# Patient Record
Sex: MALE | Race: WHITE | NOT HISPANIC OR LATINO | Employment: OTHER | URBAN - METROPOLITAN AREA
[De-identification: names, ages, dates, MRNs, and addresses within clinical notes are randomized per-mention and may not be internally consistent; named-entity substitution may affect disease eponyms.]

---

## 2021-01-19 ENCOUNTER — APPOINTMENT (OUTPATIENT)
Dept: RADIOLOGY | Facility: CLINIC | Age: 81
End: 2021-01-19
Payer: COMMERCIAL

## 2021-01-19 ENCOUNTER — OFFICE VISIT (OUTPATIENT)
Dept: URGENT CARE | Facility: CLINIC | Age: 81
End: 2021-01-19
Payer: COMMERCIAL

## 2021-01-19 VITALS
DIASTOLIC BLOOD PRESSURE: 58 MMHG | WEIGHT: 164 LBS | SYSTOLIC BLOOD PRESSURE: 136 MMHG | HEART RATE: 56 BPM | TEMPERATURE: 97.5 F | OXYGEN SATURATION: 100 % | RESPIRATION RATE: 16 BRPM

## 2021-01-19 DIAGNOSIS — T25.221A PARTIAL THICKNESS BURN OF RIGHT FOOT, INITIAL ENCOUNTER: ICD-10-CM

## 2021-01-19 DIAGNOSIS — T25.221A PARTIAL THICKNESS BURN OF RIGHT FOOT, INITIAL ENCOUNTER: Primary | ICD-10-CM

## 2021-01-19 PROCEDURE — 73630 X-RAY EXAM OF FOOT: CPT

## 2021-01-19 PROCEDURE — 99203 OFFICE O/P NEW LOW 30 MIN: CPT | Performed by: FAMILY MEDICINE

## 2021-01-19 RX ORDER — FLUTICASONE PROPIONATE 50 MCG
SPRAY, SUSPENSION (ML) NASAL
COMMUNITY
Start: 2021-01-06

## 2021-01-19 RX ORDER — CANDESARTAN 4 MG/1
TABLET ORAL
COMMUNITY
Start: 2020-10-29

## 2021-01-19 RX ORDER — TICAGRELOR 90 MG/1
TABLET ORAL
COMMUNITY
Start: 2020-12-20

## 2021-01-19 RX ORDER — FINASTERIDE 5 MG/1
TABLET, FILM COATED ORAL
COMMUNITY
Start: 2021-01-18

## 2021-01-19 RX ORDER — ASPIRIN 81 MG/1
TABLET, COATED ORAL
COMMUNITY
Start: 2020-12-21

## 2021-01-19 RX ORDER — ATORVASTATIN CALCIUM 40 MG/1
TABLET, FILM COATED ORAL
COMMUNITY
Start: 2021-01-18
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
## 2021-03-10 ENCOUNTER — NEW REFERRAL (OUTPATIENT)
Dept: URBAN - METROPOLITAN AREA CLINIC 52 | Facility: CLINIC | Age: 81
End: 2021-03-10

## 2021-03-10 DIAGNOSIS — H25.812: ICD-10-CM

## 2021-03-10 DIAGNOSIS — H43.813: ICD-10-CM

## 2021-03-10 DIAGNOSIS — H35.3112: ICD-10-CM

## 2021-03-10 DIAGNOSIS — H40.1133: ICD-10-CM

## 2021-03-10 DIAGNOSIS — H35.3221: ICD-10-CM

## 2021-03-10 DIAGNOSIS — Z96.1: ICD-10-CM

## 2021-03-10 PROCEDURE — 67028 INJECTION EYE DRUG: CPT

## 2021-03-10 PROCEDURE — 99204 OFFICE O/P NEW MOD 45 MIN: CPT | Mod: 25

## 2021-03-10 PROCEDURE — 92235 FLUORESCEIN ANGRPH MLTIFRAME: CPT

## 2021-03-10 PROCEDURE — 92250 FUNDUS PHOTOGRAPHY W/I&R: CPT

## 2021-03-10 ASSESSMENT — VISUAL ACUITY
OD_CC: 20/70
OD_CC: 20/200
OS_CC: 20/70-2
OS_CC: 20/30

## 2021-03-10 ASSESSMENT — TONOMETRY
OD_IOP_MMHG: 21
OS_IOP_MMHG: 15

## 2021-03-24 ENCOUNTER — FOLLOW UP (OUTPATIENT)
Dept: URBAN - METROPOLITAN AREA CLINIC 52 | Facility: CLINIC | Age: 81
End: 2021-03-24

## 2021-03-24 DIAGNOSIS — H43.813: ICD-10-CM

## 2021-03-24 DIAGNOSIS — H40.1133: ICD-10-CM

## 2021-03-24 DIAGNOSIS — H35.3112: ICD-10-CM

## 2021-03-24 DIAGNOSIS — Z96.1: ICD-10-CM

## 2021-03-24 DIAGNOSIS — H25.812: ICD-10-CM

## 2021-03-24 DIAGNOSIS — H35.3221: ICD-10-CM

## 2021-03-24 PROCEDURE — 92012 INTRM OPH EXAM EST PATIENT: CPT

## 2021-03-24 PROCEDURE — 92134 CPTRZ OPH DX IMG PST SGM RTA: CPT

## 2021-03-24 ASSESSMENT — VISUAL ACUITY
OS_CC: 20/25
OD_CC: 20/60+1

## 2021-03-24 ASSESSMENT — TONOMETRY
OD_IOP_MMHG: 21
OS_IOP_MMHG: 17

## 2021-04-14 ENCOUNTER — FOLLOW UP (OUTPATIENT)
Dept: URBAN - METROPOLITAN AREA CLINIC 52 | Facility: CLINIC | Age: 81
End: 2021-04-14

## 2021-04-14 DIAGNOSIS — Z96.1: ICD-10-CM

## 2021-04-14 DIAGNOSIS — H35.3112: ICD-10-CM

## 2021-04-14 DIAGNOSIS — H40.1133: ICD-10-CM

## 2021-04-14 DIAGNOSIS — H35.3221: ICD-10-CM

## 2021-04-14 DIAGNOSIS — H25.812: ICD-10-CM

## 2021-04-14 DIAGNOSIS — H43.813: ICD-10-CM

## 2021-04-14 PROCEDURE — PFS EYLEA PFS

## 2021-04-14 PROCEDURE — 92014 COMPRE OPH EXAM EST PT 1/>: CPT | Mod: 25

## 2021-04-14 PROCEDURE — 67028 INJECTION EYE DRUG: CPT

## 2021-04-14 PROCEDURE — 92134 CPTRZ OPH DX IMG PST SGM RTA: CPT

## 2021-04-14 ASSESSMENT — VISUAL ACUITY
OS_CC: 20/25
OD_CC: 20/70

## 2021-04-14 ASSESSMENT — TONOMETRY
OD_IOP_MMHG: 18
OS_IOP_MMHG: 10

## 2021-06-02 ENCOUNTER — FOLLOW UP (OUTPATIENT)
Dept: URBAN - METROPOLITAN AREA CLINIC 52 | Facility: CLINIC | Age: 81
End: 2021-06-02

## 2021-06-02 DIAGNOSIS — H25.812: ICD-10-CM

## 2021-06-02 DIAGNOSIS — Z96.1: ICD-10-CM

## 2021-06-02 DIAGNOSIS — H35.3221: ICD-10-CM

## 2021-06-02 DIAGNOSIS — H35.3112: ICD-10-CM

## 2021-06-02 DIAGNOSIS — H40.1133: ICD-10-CM

## 2021-06-02 DIAGNOSIS — H43.813: ICD-10-CM

## 2021-06-02 PROCEDURE — 92012 INTRM OPH EXAM EST PATIENT: CPT | Mod: 25

## 2021-06-02 PROCEDURE — 92134 CPTRZ OPH DX IMG PST SGM RTA: CPT

## 2021-06-02 PROCEDURE — PFS EYLEA PFS

## 2021-06-02 PROCEDURE — 67028 INJECTION EYE DRUG: CPT

## 2021-06-02 ASSESSMENT — TONOMETRY
OD_IOP_MMHG: 12
OS_IOP_MMHG: 13

## 2021-06-02 ASSESSMENT — VISUAL ACUITY
OD_CC: 20/70
OS_CC: 20/25

## 2021-07-28 ENCOUNTER — FOLLOW UP (OUTPATIENT)
Dept: URBAN - METROPOLITAN AREA CLINIC 52 | Facility: CLINIC | Age: 81
End: 2021-07-28

## 2021-07-28 DIAGNOSIS — H35.3221: ICD-10-CM

## 2021-07-28 DIAGNOSIS — H35.3112: ICD-10-CM

## 2021-07-28 DIAGNOSIS — H43.813: ICD-10-CM

## 2021-07-28 DIAGNOSIS — H25.812: ICD-10-CM

## 2021-07-28 DIAGNOSIS — H40.1133: ICD-10-CM

## 2021-07-28 DIAGNOSIS — Z96.1: ICD-10-CM

## 2021-07-28 PROCEDURE — 92134 CPTRZ OPH DX IMG PST SGM RTA: CPT

## 2021-07-28 PROCEDURE — 92014 COMPRE OPH EXAM EST PT 1/>: CPT | Mod: 25

## 2021-07-28 PROCEDURE — 67028 INJECTION EYE DRUG: CPT

## 2021-07-28 PROCEDURE — 92202 OPSCPY EXTND ON/MAC DRAW: CPT

## 2021-07-28 PROCEDURE — PFS EYLEA PFS

## 2021-07-28 ASSESSMENT — TONOMETRY
OD_IOP_MMHG: 16
OS_IOP_MMHG: 11

## 2021-07-28 ASSESSMENT — VISUAL ACUITY
OS_CC: 20/30-
OD_CC: 20/100

## 2021-10-06 ENCOUNTER — FOLLOW UP (OUTPATIENT)
Dept: URBAN - METROPOLITAN AREA CLINIC 52 | Facility: CLINIC | Age: 81
End: 2021-10-06

## 2021-10-06 DIAGNOSIS — H35.3221: ICD-10-CM

## 2021-10-06 DIAGNOSIS — H35.3112: ICD-10-CM

## 2021-10-06 DIAGNOSIS — Z96.1: ICD-10-CM

## 2021-10-06 DIAGNOSIS — H43.813: ICD-10-CM

## 2021-10-06 DIAGNOSIS — H25.812: ICD-10-CM

## 2021-10-06 DIAGNOSIS — H40.1133: ICD-10-CM

## 2021-10-06 PROCEDURE — 92014 COMPRE OPH EXAM EST PT 1/>: CPT | Mod: 25

## 2021-10-06 PROCEDURE — 92202 OPSCPY EXTND ON/MAC DRAW: CPT

## 2021-10-06 PROCEDURE — PFS EYLEA PFS

## 2021-10-06 PROCEDURE — 67028 INJECTION EYE DRUG: CPT

## 2021-10-06 PROCEDURE — 92134 CPTRZ OPH DX IMG PST SGM RTA: CPT

## 2021-10-06 ASSESSMENT — TONOMETRY
OS_IOP_MMHG: 16
OD_IOP_MMHG: 14

## 2021-10-06 ASSESSMENT — VISUAL ACUITY
OD_CC: 20/80
OS_CC: 20/30

## 2021-12-15 ENCOUNTER — FOLLOW UP (OUTPATIENT)
Dept: URBAN - METROPOLITAN AREA CLINIC 52 | Facility: CLINIC | Age: 81
End: 2021-12-15

## 2021-12-15 DIAGNOSIS — H25.812: ICD-10-CM

## 2021-12-15 DIAGNOSIS — H40.1133: ICD-10-CM

## 2021-12-15 DIAGNOSIS — H35.3221: ICD-10-CM

## 2021-12-15 DIAGNOSIS — H43.813: ICD-10-CM

## 2021-12-15 DIAGNOSIS — H35.3112: ICD-10-CM

## 2021-12-15 DIAGNOSIS — Z96.1: ICD-10-CM

## 2021-12-15 PROCEDURE — 92202 OPSCPY EXTND ON/MAC DRAW: CPT

## 2021-12-15 PROCEDURE — 67028 INJECTION EYE DRUG: CPT

## 2021-12-15 PROCEDURE — PFS EYLEA PFS

## 2021-12-15 PROCEDURE — 92014 COMPRE OPH EXAM EST PT 1/>: CPT | Mod: 25

## 2021-12-15 PROCEDURE — 92134 CPTRZ OPH DX IMG PST SGM RTA: CPT

## 2021-12-15 ASSESSMENT — TONOMETRY
OD_IOP_MMHG: 21
OS_IOP_MMHG: 8

## 2021-12-15 ASSESSMENT — VISUAL ACUITY
OS_CC: 20/30-2
OD_CC: 20/80-2

## 2022-03-11 ENCOUNTER — FOLLOW UP (OUTPATIENT)
Dept: URBAN - METROPOLITAN AREA CLINIC 52 | Facility: CLINIC | Age: 82
End: 2022-03-11

## 2022-03-11 DIAGNOSIS — Z96.1: ICD-10-CM

## 2022-03-11 DIAGNOSIS — H35.3112: ICD-10-CM

## 2022-03-11 DIAGNOSIS — H43.813: ICD-10-CM

## 2022-03-11 DIAGNOSIS — H35.3221: ICD-10-CM

## 2022-03-11 DIAGNOSIS — H25.812: ICD-10-CM

## 2022-03-11 DIAGNOSIS — H40.1133: ICD-10-CM

## 2022-03-11 PROCEDURE — 92134 CPTRZ OPH DX IMG PST SGM RTA: CPT

## 2022-03-11 PROCEDURE — 92202 OPSCPY EXTND ON/MAC DRAW: CPT

## 2022-03-11 PROCEDURE — 92014 COMPRE OPH EXAM EST PT 1/>: CPT | Mod: 25

## 2022-03-11 PROCEDURE — PFS EYLEA PFS

## 2022-03-11 PROCEDURE — 67028 INJECTION EYE DRUG: CPT

## 2022-03-11 ASSESSMENT — VISUAL ACUITY
OD_CC: 20/80+2
OS_CC: 20/25

## 2022-03-11 ASSESSMENT — TONOMETRY
OD_IOP_MMHG: 14
OS_IOP_MMHG: 15

## 2022-06-01 ENCOUNTER — FOLLOW UP (OUTPATIENT)
Dept: URBAN - METROPOLITAN AREA CLINIC 52 | Facility: CLINIC | Age: 82
End: 2022-06-01

## 2022-06-01 DIAGNOSIS — H35.3112: ICD-10-CM

## 2022-06-01 DIAGNOSIS — H35.3221: ICD-10-CM

## 2022-06-01 DIAGNOSIS — H40.1133: ICD-10-CM

## 2022-06-01 DIAGNOSIS — Z96.1: ICD-10-CM

## 2022-06-01 DIAGNOSIS — H43.813: ICD-10-CM

## 2022-06-01 DIAGNOSIS — H25.812: ICD-10-CM

## 2022-06-01 PROCEDURE — PFS EYLEA PFS

## 2022-06-01 PROCEDURE — 92134 CPTRZ OPH DX IMG PST SGM RTA: CPT

## 2022-06-01 PROCEDURE — 92202 OPSCPY EXTND ON/MAC DRAW: CPT

## 2022-06-01 PROCEDURE — 92014 COMPRE OPH EXAM EST PT 1/>: CPT | Mod: 25

## 2022-06-01 PROCEDURE — 67028 INJECTION EYE DRUG: CPT

## 2022-06-01 ASSESSMENT — TONOMETRY
OD_IOP_MMHG: 21
OS_IOP_MMHG: 10

## 2022-06-01 ASSESSMENT — VISUAL ACUITY
OS_SC: 20/30-2
OD_SC: 20/80-1

## 2022-08-31 ENCOUNTER — FOLLOW UP (OUTPATIENT)
Dept: URBAN - METROPOLITAN AREA CLINIC 52 | Facility: CLINIC | Age: 82
End: 2022-08-31

## 2022-08-31 DIAGNOSIS — H35.3112: ICD-10-CM

## 2022-08-31 DIAGNOSIS — Z96.1: ICD-10-CM

## 2022-08-31 DIAGNOSIS — H43.813: ICD-10-CM

## 2022-08-31 DIAGNOSIS — H35.3221: ICD-10-CM

## 2022-08-31 DIAGNOSIS — H25.812: ICD-10-CM

## 2022-08-31 DIAGNOSIS — H40.1133: ICD-10-CM

## 2022-08-31 PROCEDURE — 67028 INJECTION EYE DRUG: CPT

## 2022-08-31 PROCEDURE — PFS EYLEA PFS

## 2022-08-31 PROCEDURE — 92134 CPTRZ OPH DX IMG PST SGM RTA: CPT

## 2022-08-31 PROCEDURE — 92014 COMPRE OPH EXAM EST PT 1/>: CPT | Mod: 25

## 2022-08-31 PROCEDURE — 92202 OPSCPY EXTND ON/MAC DRAW: CPT

## 2022-08-31 ASSESSMENT — VISUAL ACUITY
OD_CC: 20/80-2
OS_CC: 20/40-1

## 2022-08-31 ASSESSMENT — TONOMETRY
OD_IOP_MMHG: 21
OS_IOP_MMHG: 17

## 2022-11-30 ENCOUNTER — FOLLOW UP (OUTPATIENT)
Dept: URBAN - METROPOLITAN AREA CLINIC 52 | Facility: CLINIC | Age: 82
End: 2022-11-30

## 2022-11-30 DIAGNOSIS — H43.813: ICD-10-CM

## 2022-11-30 DIAGNOSIS — H35.3221: ICD-10-CM

## 2022-11-30 DIAGNOSIS — H35.3112: ICD-10-CM

## 2022-11-30 DIAGNOSIS — Z96.1: ICD-10-CM

## 2022-11-30 DIAGNOSIS — H40.1133: ICD-10-CM

## 2022-11-30 DIAGNOSIS — H25.812: ICD-10-CM

## 2022-11-30 PROCEDURE — 92134 CPTRZ OPH DX IMG PST SGM RTA: CPT

## 2022-11-30 PROCEDURE — 67028 INJECTION EYE DRUG: CPT

## 2022-11-30 PROCEDURE — PFS EYLEA PFS

## 2022-11-30 PROCEDURE — 92014 COMPRE OPH EXAM EST PT 1/>: CPT | Mod: 25

## 2022-11-30 ASSESSMENT — VISUAL ACUITY
OS_CC: 20/40
OD_CC: 20/100-2

## 2022-11-30 ASSESSMENT — TONOMETRY
OS_IOP_MMHG: 14
OD_IOP_MMHG: 23

## 2023-03-01 ENCOUNTER — FOLLOW UP (OUTPATIENT)
Dept: URBAN - METROPOLITAN AREA CLINIC 52 | Facility: CLINIC | Age: 83
End: 2023-03-01

## 2023-03-01 DIAGNOSIS — H40.1133: ICD-10-CM

## 2023-03-01 DIAGNOSIS — H25.812: ICD-10-CM

## 2023-03-01 DIAGNOSIS — H35.3221: ICD-10-CM

## 2023-03-01 DIAGNOSIS — H35.3112: ICD-10-CM

## 2023-03-01 DIAGNOSIS — Z96.1: ICD-10-CM

## 2023-03-01 DIAGNOSIS — H43.813: ICD-10-CM

## 2023-03-01 PROCEDURE — PFS EYLEA PFS

## 2023-03-01 PROCEDURE — 92014 COMPRE OPH EXAM EST PT 1/>: CPT | Mod: 25

## 2023-03-01 PROCEDURE — 92202 OPSCPY EXTND ON/MAC DRAW: CPT

## 2023-03-01 PROCEDURE — 67028 INJECTION EYE DRUG: CPT

## 2023-03-01 ASSESSMENT — TONOMETRY
OD_IOP_MMHG: 22
OS_IOP_MMHG: 14

## 2023-03-01 ASSESSMENT — VISUAL ACUITY
OD_CC: 20/100
OS_CC: 20/40+1

## 2023-06-07 ENCOUNTER — FOLLOW UP (OUTPATIENT)
Dept: URBAN - METROPOLITAN AREA CLINIC 52 | Facility: CLINIC | Age: 83
End: 2023-06-07

## 2023-06-07 DIAGNOSIS — H35.3112: ICD-10-CM

## 2023-06-07 DIAGNOSIS — H35.3221: ICD-10-CM

## 2023-06-07 DIAGNOSIS — H43.813: ICD-10-CM

## 2023-06-07 DIAGNOSIS — H25.812: ICD-10-CM

## 2023-06-07 DIAGNOSIS — H40.1133: ICD-10-CM

## 2023-06-07 DIAGNOSIS — Z96.1: ICD-10-CM

## 2023-06-07 PROCEDURE — PFS EYLEA PFS

## 2023-06-07 PROCEDURE — 92014 COMPRE OPH EXAM EST PT 1/>: CPT | Mod: 25

## 2023-06-07 PROCEDURE — 67028 INJECTION EYE DRUG: CPT

## 2023-06-07 PROCEDURE — 92134 CPTRZ OPH DX IMG PST SGM RTA: CPT

## 2023-06-07 ASSESSMENT — VISUAL ACUITY
OD_CC: 20/200
OS_CC: 20/40

## 2023-06-07 ASSESSMENT — TONOMETRY
OS_IOP_MMHG: 13
OD_IOP_MMHG: 24

## 2023-08-30 ENCOUNTER — FOLLOW UP (OUTPATIENT)
Dept: URBAN - METROPOLITAN AREA CLINIC 52 | Facility: CLINIC | Age: 83
End: 2023-08-30

## 2023-08-30 DIAGNOSIS — H25.812: ICD-10-CM

## 2023-08-30 DIAGNOSIS — H35.3221: ICD-10-CM

## 2023-08-30 DIAGNOSIS — Z96.1: ICD-10-CM

## 2023-08-30 DIAGNOSIS — H43.813: ICD-10-CM

## 2023-08-30 DIAGNOSIS — H35.3112: ICD-10-CM

## 2023-08-30 DIAGNOSIS — H40.1133: ICD-10-CM

## 2023-08-30 PROCEDURE — 92202 OPSCPY EXTND ON/MAC DRAW: CPT | Mod: NC

## 2023-08-30 PROCEDURE — 67028 INJECTION EYE DRUG: CPT

## 2023-08-30 PROCEDURE — PFS EYLEA PFS: Mod: JZ

## 2023-08-30 PROCEDURE — 92014 COMPRE OPH EXAM EST PT 1/>: CPT | Mod: 25

## 2023-08-30 PROCEDURE — 92250 FUNDUS PHOTOGRAPHY W/I&R: CPT

## 2023-08-30 ASSESSMENT — VISUAL ACUITY
OD_CC: 20/150-2
OS_CC: 20/40-2

## 2023-08-30 ASSESSMENT — TONOMETRY
OD_IOP_MMHG: 28
OS_IOP_MMHG: 17

## 2023-11-29 ENCOUNTER — FOLLOW UP (OUTPATIENT)
Dept: URBAN - METROPOLITAN AREA CLINIC 52 | Facility: CLINIC | Age: 83
End: 2023-11-29

## 2023-11-29 DIAGNOSIS — H35.3112: ICD-10-CM

## 2023-11-29 DIAGNOSIS — H43.813: ICD-10-CM

## 2023-11-29 DIAGNOSIS — Z96.1: ICD-10-CM

## 2023-11-29 DIAGNOSIS — H35.3221: ICD-10-CM

## 2023-11-29 DIAGNOSIS — H40.1133: ICD-10-CM

## 2023-11-29 DIAGNOSIS — H25.812: ICD-10-CM

## 2023-11-29 PROCEDURE — 92202 OPSCPY EXTND ON/MAC DRAW: CPT | Mod: NC

## 2023-11-29 PROCEDURE — HD EYLEA HD 8MG: Mod: JZ

## 2023-11-29 PROCEDURE — 92014 COMPRE OPH EXAM EST PT 1/>: CPT | Mod: 25

## 2023-11-29 PROCEDURE — 67028 INJECTION EYE DRUG: CPT

## 2023-11-29 PROCEDURE — 92134 CPTRZ OPH DX IMG PST SGM RTA: CPT

## 2023-11-29 PROCEDURE — 92250 FUNDUS PHOTOGRAPHY W/I&R: CPT

## 2023-11-29 ASSESSMENT — VISUAL ACUITY
OS_PH: 20/40-2
OD_PH: 20/250-2
OD_CC: 20/350+1
OS_CC: 20/60-1

## 2023-11-29 ASSESSMENT — TONOMETRY
OD_IOP_MMHG: 20
OS_IOP_MMHG: 12

## 2023-12-01 ENCOUNTER — PROBLEM (OUTPATIENT)
Dept: URBAN - METROPOLITAN AREA CLINIC 52 | Facility: CLINIC | Age: 83
End: 2023-12-01

## 2023-12-01 DIAGNOSIS — H40.1133: ICD-10-CM

## 2023-12-01 DIAGNOSIS — H35.3221: ICD-10-CM

## 2023-12-01 DIAGNOSIS — H35.3112: ICD-10-CM

## 2023-12-01 DIAGNOSIS — H25.812: ICD-10-CM

## 2023-12-01 DIAGNOSIS — H43.12: ICD-10-CM

## 2023-12-01 DIAGNOSIS — Z96.1: ICD-10-CM

## 2023-12-01 DIAGNOSIS — H43.813: ICD-10-CM

## 2023-12-01 PROCEDURE — 92134 CPTRZ OPH DX IMG PST SGM RTA: CPT

## 2023-12-01 PROCEDURE — 92012 INTRM OPH EXAM EST PATIENT: CPT

## 2023-12-01 PROCEDURE — 92201 OPSCPY EXTND RTA DRAW UNI/BI: CPT

## 2023-12-01 ASSESSMENT — VISUAL ACUITY
OS_PH: 20/50-1
OS_CC: 20/80+1
OD_PH: 20/300-1
OD_CC: 20/350-1

## 2023-12-01 ASSESSMENT — TONOMETRY
OD_IOP_MMHG: 19
OS_IOP_MMHG: 09

## 2023-12-04 ENCOUNTER — FOLLOW UP (OUTPATIENT)
Dept: URBAN - METROPOLITAN AREA CLINIC 52 | Facility: CLINIC | Age: 83
End: 2023-12-04

## 2023-12-04 DIAGNOSIS — H35.3221: ICD-10-CM

## 2023-12-04 DIAGNOSIS — D31.32: ICD-10-CM

## 2023-12-04 DIAGNOSIS — H25.812: ICD-10-CM

## 2023-12-04 DIAGNOSIS — H43.12: ICD-10-CM

## 2023-12-04 DIAGNOSIS — H40.1133: ICD-10-CM

## 2023-12-04 DIAGNOSIS — H43.813: ICD-10-CM

## 2023-12-04 PROCEDURE — 92202 OPSCPY EXTND ON/MAC DRAW: CPT

## 2023-12-04 PROCEDURE — 92012 INTRM OPH EXAM EST PATIENT: CPT

## 2023-12-04 ASSESSMENT — VISUAL ACUITY
OS_CC: 20/40
OD_CC: 20/400

## 2023-12-04 ASSESSMENT — TONOMETRY
OD_IOP_MMHG: 15
OS_IOP_MMHG: 13

## 2023-12-11 ENCOUNTER — PROBLEM (OUTPATIENT)
Dept: URBAN - METROPOLITAN AREA CLINIC 52 | Facility: CLINIC | Age: 83
End: 2023-12-11

## 2023-12-11 DIAGNOSIS — H43.12: ICD-10-CM

## 2023-12-11 DIAGNOSIS — H35.3112: ICD-10-CM

## 2023-12-11 DIAGNOSIS — H40.1133: ICD-10-CM

## 2023-12-11 DIAGNOSIS — Z96.1: ICD-10-CM

## 2023-12-11 DIAGNOSIS — H25.812: ICD-10-CM

## 2023-12-11 DIAGNOSIS — H35.3221: ICD-10-CM

## 2023-12-11 DIAGNOSIS — D31.32: ICD-10-CM

## 2023-12-11 DIAGNOSIS — H43.813: ICD-10-CM

## 2023-12-11 PROCEDURE — 92012 INTRM OPH EXAM EST PATIENT: CPT

## 2023-12-11 PROCEDURE — 92134 CPTRZ OPH DX IMG PST SGM RTA: CPT

## 2023-12-11 PROCEDURE — 92201 OPSCPY EXTND RTA DRAW UNI/BI: CPT

## 2023-12-11 PROCEDURE — 76512 OPH US DX B-SCAN: CPT

## 2023-12-11 ASSESSMENT — TONOMETRY
OS_IOP_MMHG: 9
OD_IOP_MMHG: 18

## 2023-12-11 ASSESSMENT — VISUAL ACUITY
OS_CC: 20/50
OD_CC: 20/400

## 2023-12-18 ENCOUNTER — FOLLOW UP (OUTPATIENT)
Dept: URBAN - METROPOLITAN AREA CLINIC 52 | Facility: CLINIC | Age: 83
End: 2023-12-18

## 2023-12-18 DIAGNOSIS — H25.812: ICD-10-CM

## 2023-12-18 DIAGNOSIS — H40.1133: ICD-10-CM

## 2023-12-18 DIAGNOSIS — D31.32: ICD-10-CM

## 2023-12-18 DIAGNOSIS — H35.3221: ICD-10-CM

## 2023-12-18 DIAGNOSIS — Z96.1: ICD-10-CM

## 2023-12-18 DIAGNOSIS — H35.3112: ICD-10-CM

## 2023-12-18 DIAGNOSIS — H43.12: ICD-10-CM

## 2023-12-18 DIAGNOSIS — H43.813: ICD-10-CM

## 2023-12-18 PROCEDURE — 76512 OPH US DX B-SCAN: CPT

## 2023-12-18 PROCEDURE — 92134 CPTRZ OPH DX IMG PST SGM RTA: CPT

## 2023-12-18 PROCEDURE — 92201 OPSCPY EXTND RTA DRAW UNI/BI: CPT

## 2023-12-18 PROCEDURE — 92014 COMPRE OPH EXAM EST PT 1/>: CPT

## 2023-12-18 ASSESSMENT — VISUAL ACUITY
OD_CC: 20/250-1
OS_CC: 20/50-2

## 2023-12-18 ASSESSMENT — TONOMETRY
OS_IOP_MMHG: 10
OD_IOP_MMHG: 16

## 2024-01-03 ENCOUNTER — FOLLOW UP (OUTPATIENT)
Dept: URBAN - METROPOLITAN AREA CLINIC 52 | Facility: CLINIC | Age: 84
End: 2024-01-03

## 2024-01-03 DIAGNOSIS — H43.813: ICD-10-CM

## 2024-01-03 DIAGNOSIS — H40.1133: ICD-10-CM

## 2024-01-03 DIAGNOSIS — D31.32: ICD-10-CM

## 2024-01-03 DIAGNOSIS — Z96.1: ICD-10-CM

## 2024-01-03 DIAGNOSIS — H35.3112: ICD-10-CM

## 2024-01-03 DIAGNOSIS — H43.12: ICD-10-CM

## 2024-01-03 DIAGNOSIS — H35.3221: ICD-10-CM

## 2024-01-03 DIAGNOSIS — H25.812: ICD-10-CM

## 2024-01-03 PROCEDURE — 92201 OPSCPY EXTND RTA DRAW UNI/BI: CPT

## 2024-01-03 PROCEDURE — 92134 CPTRZ OPH DX IMG PST SGM RTA: CPT

## 2024-01-03 PROCEDURE — 76512 OPH US DX B-SCAN: CPT

## 2024-01-03 PROCEDURE — 92014 COMPRE OPH EXAM EST PT 1/>: CPT

## 2024-01-03 ASSESSMENT — TONOMETRY
OS_IOP_MMHG: 7
OD_IOP_MMHG: 19

## 2024-01-03 ASSESSMENT — VISUAL ACUITY
OS_CC: 20/60-2
OD_CC: 20/250-1

## 2024-01-24 ENCOUNTER — FOLLOW UP (OUTPATIENT)
Dept: URBAN - METROPOLITAN AREA CLINIC 52 | Facility: CLINIC | Age: 84
End: 2024-01-24

## 2024-01-24 DIAGNOSIS — H43.12: ICD-10-CM

## 2024-01-24 DIAGNOSIS — Z96.1: ICD-10-CM

## 2024-01-24 DIAGNOSIS — D31.32: ICD-10-CM

## 2024-01-24 DIAGNOSIS — H35.3221: ICD-10-CM

## 2024-01-24 PROCEDURE — 92014 COMPRE OPH EXAM EST PT 1/>: CPT

## 2024-01-24 PROCEDURE — 92134 CPTRZ OPH DX IMG PST SGM RTA: CPT

## 2024-01-24 PROCEDURE — 92202 OPSCPY EXTND ON/MAC DRAW: CPT

## 2024-01-24 ASSESSMENT — VISUAL ACUITY
OS_CC: 20/70
OD_CC: 20/300

## 2024-01-24 ASSESSMENT — TONOMETRY
OD_IOP_MMHG: 22
OS_IOP_MMHG: 10

## 2024-03-06 ENCOUNTER — FOLLOW UP (OUTPATIENT)
Dept: URBAN - METROPOLITAN AREA CLINIC 52 | Facility: CLINIC | Age: 84
End: 2024-03-06

## 2024-03-06 DIAGNOSIS — H35.3112: ICD-10-CM

## 2024-03-06 DIAGNOSIS — H35.3221: ICD-10-CM

## 2024-03-06 DIAGNOSIS — Z96.1: ICD-10-CM

## 2024-03-06 DIAGNOSIS — H25.812: ICD-10-CM

## 2024-03-06 DIAGNOSIS — H43.813: ICD-10-CM

## 2024-03-06 DIAGNOSIS — D31.32: ICD-10-CM

## 2024-03-06 DIAGNOSIS — H43.12: ICD-10-CM

## 2024-03-06 DIAGNOSIS — H40.1133: ICD-10-CM

## 2024-03-06 PROCEDURE — HD EYLEA HD 8MG: Mod: JZ

## 2024-03-06 PROCEDURE — 92014 COMPRE OPH EXAM EST PT 1/>: CPT | Mod: 25

## 2024-03-06 PROCEDURE — 92134 CPTRZ OPH DX IMG PST SGM RTA: CPT

## 2024-03-06 PROCEDURE — 67028 INJECTION EYE DRUG: CPT

## 2024-03-06 ASSESSMENT — TONOMETRY
OS_IOP_MMHG: 14
OD_IOP_MMHG: 23

## 2024-03-06 ASSESSMENT — VISUAL ACUITY
OD_CC: 20/350+1
OS_CC: 20/70-2

## 2024-04-17 ENCOUNTER — FOLLOW UP (OUTPATIENT)
Dept: URBAN - METROPOLITAN AREA CLINIC 52 | Facility: CLINIC | Age: 84
End: 2024-04-17

## 2024-04-17 DIAGNOSIS — H35.3221: ICD-10-CM

## 2024-04-17 DIAGNOSIS — H35.3112: ICD-10-CM

## 2024-04-17 DIAGNOSIS — Z96.1: ICD-10-CM

## 2024-04-17 DIAGNOSIS — H43.813: ICD-10-CM

## 2024-04-17 DIAGNOSIS — H40.1133: ICD-10-CM

## 2024-04-17 DIAGNOSIS — H43.12: ICD-10-CM

## 2024-04-17 DIAGNOSIS — D31.32: ICD-10-CM

## 2024-04-17 DIAGNOSIS — H25.812: ICD-10-CM

## 2024-04-17 PROCEDURE — 92012 INTRM OPH EXAM EST PATIENT: CPT

## 2024-04-17 ASSESSMENT — TONOMETRY
OD_IOP_MMHG: 21
OS_IOP_MMHG: 10

## 2024-04-17 ASSESSMENT — VISUAL ACUITY
OS_CC: 20/30
OD_CC: 20/250

## 2024-05-29 ENCOUNTER — FOLLOW UP (OUTPATIENT)
Dept: URBAN - METROPOLITAN AREA CLINIC 52 | Facility: CLINIC | Age: 84
End: 2024-05-29

## 2024-05-29 DIAGNOSIS — Z96.1: ICD-10-CM

## 2024-05-29 DIAGNOSIS — D31.32: ICD-10-CM

## 2024-05-29 DIAGNOSIS — H43.813: ICD-10-CM

## 2024-05-29 DIAGNOSIS — H35.3112: ICD-10-CM

## 2024-05-29 DIAGNOSIS — H43.12: ICD-10-CM

## 2024-05-29 DIAGNOSIS — H40.1133: ICD-10-CM

## 2024-05-29 DIAGNOSIS — H35.3221: ICD-10-CM

## 2024-05-29 DIAGNOSIS — H25.812: ICD-10-CM

## 2024-05-29 PROCEDURE — 67028 INJECTION EYE DRUG: CPT

## 2024-05-29 PROCEDURE — 92134 CPTRZ OPH DX IMG PST SGM RTA: CPT

## 2024-05-29 PROCEDURE — 92014 COMPRE OPH EXAM EST PT 1/>: CPT | Mod: 25

## 2024-05-29 ASSESSMENT — VISUAL ACUITY
OS_CC: 20/40
OD_CC: 20/400

## 2024-05-29 ASSESSMENT — TONOMETRY
OS_IOP_MMHG: 11
OD_IOP_MMHG: 22

## 2024-08-28 ENCOUNTER — FOLLOW UP (OUTPATIENT)
Dept: URBAN - METROPOLITAN AREA CLINIC 52 | Facility: CLINIC | Age: 84
End: 2024-08-28

## 2024-08-28 DIAGNOSIS — H35.3221: ICD-10-CM

## 2024-08-28 DIAGNOSIS — H43.12: ICD-10-CM

## 2024-08-28 DIAGNOSIS — D31.32: ICD-10-CM

## 2024-08-28 DIAGNOSIS — H40.1133: ICD-10-CM

## 2024-08-28 DIAGNOSIS — H35.3112: ICD-10-CM

## 2024-08-28 DIAGNOSIS — H43.813: ICD-10-CM

## 2024-08-28 DIAGNOSIS — Z96.1: ICD-10-CM

## 2024-08-28 PROCEDURE — 92202 OPSCPY EXTND ON/MAC DRAW: CPT | Mod: NC

## 2024-08-28 PROCEDURE — 67028 INJECTION EYE DRUG: CPT

## 2024-08-28 PROCEDURE — 92014 COMPRE OPH EXAM EST PT 1/>: CPT | Mod: 25

## 2024-08-28 PROCEDURE — 92134 CPTRZ OPH DX IMG PST SGM RTA: CPT

## 2024-08-28 ASSESSMENT — TONOMETRY
OS_IOP_MMHG: 12
OD_IOP_MMHG: 23

## 2024-08-28 ASSESSMENT — VISUAL ACUITY
OD_CC: 20/350
OS_CC: 20/40+2

## 2024-12-11 ENCOUNTER — FOLLOW UP (OUTPATIENT)
Dept: URBAN - METROPOLITAN AREA CLINIC 52 | Facility: CLINIC | Age: 84
End: 2024-12-11

## 2024-12-11 DIAGNOSIS — H35.3221: ICD-10-CM

## 2024-12-11 DIAGNOSIS — H43.813: ICD-10-CM

## 2024-12-11 DIAGNOSIS — Z96.1: ICD-10-CM

## 2024-12-11 DIAGNOSIS — D31.32: ICD-10-CM

## 2024-12-11 DIAGNOSIS — H40.1133: ICD-10-CM

## 2024-12-11 DIAGNOSIS — H35.3112: ICD-10-CM

## 2024-12-11 PROCEDURE — 92134 CPTRZ OPH DX IMG PST SGM RTA: CPT

## 2024-12-11 PROCEDURE — 67028 INJECTION EYE DRUG: CPT

## 2024-12-11 PROCEDURE — 92014 COMPRE OPH EXAM EST PT 1/>: CPT | Mod: 25

## 2024-12-11 ASSESSMENT — TONOMETRY
OD_IOP_MMHG: 25
OD_IOP_MMHG: 28
OS_IOP_MMHG: 11

## 2024-12-11 ASSESSMENT — VISUAL ACUITY
OD_CC: 20/400+1
OS_CC: 20/40-2

## 2025-03-19 ENCOUNTER — FOLLOW UP (OUTPATIENT)
Dept: URBAN - METROPOLITAN AREA CLINIC 52 | Age: 85
End: 2025-03-19

## 2025-03-19 DIAGNOSIS — Z96.1: ICD-10-CM

## 2025-03-19 DIAGNOSIS — H35.3112: ICD-10-CM

## 2025-03-19 DIAGNOSIS — D31.32: ICD-10-CM

## 2025-03-19 DIAGNOSIS — H35.3221: ICD-10-CM

## 2025-03-19 DIAGNOSIS — H40.1133: ICD-10-CM

## 2025-03-19 DIAGNOSIS — H43.813: ICD-10-CM

## 2025-03-19 PROCEDURE — 92202 OPSCPY EXTND ON/MAC DRAW: CPT | Mod: 25,NC

## 2025-03-19 PROCEDURE — 92014 COMPRE OPH EXAM EST PT 1/>: CPT | Mod: 25

## 2025-03-19 PROCEDURE — 92134 CPTRZ OPH DX IMG PST SGM RTA: CPT

## 2025-03-19 PROCEDURE — 67028 INJECTION EYE DRUG: CPT

## 2025-03-19 ASSESSMENT — TONOMETRY
OD_IOP_MMHG: 23
OS_IOP_MMHG: 10

## 2025-03-19 ASSESSMENT — VISUAL ACUITY
OS_CC: 20/50-1
OD_PH: 20/400+1
OD_CC: 20/400

## 2025-06-11 ENCOUNTER — FOLLOW UP (OUTPATIENT)
Dept: URBAN - METROPOLITAN AREA CLINIC 52 | Age: 85
End: 2025-06-11

## 2025-06-11 DIAGNOSIS — H35.3112: ICD-10-CM

## 2025-06-11 DIAGNOSIS — H40.1133: ICD-10-CM

## 2025-06-11 DIAGNOSIS — Z96.1: ICD-10-CM

## 2025-06-11 DIAGNOSIS — H35.3221: ICD-10-CM

## 2025-06-11 DIAGNOSIS — D31.32: ICD-10-CM

## 2025-06-11 DIAGNOSIS — H43.813: ICD-10-CM

## 2025-06-11 PROCEDURE — 92202 OPSCPY EXTND ON/MAC DRAW: CPT | Mod: 59

## 2025-06-11 PROCEDURE — 92134 CPTRZ OPH DX IMG PST SGM RTA: CPT

## 2025-06-11 PROCEDURE — 67028 INJECTION EYE DRUG: CPT

## 2025-06-11 PROCEDURE — 92014 COMPRE OPH EXAM EST PT 1/>: CPT | Mod: 25

## 2025-06-11 ASSESSMENT — VISUAL ACUITY
OS_CC: 20/50
OD_CC: 20/800

## 2025-06-11 ASSESSMENT — TONOMETRY
OS_IOP_MMHG: 10
OD_IOP_MMHG: 21

## 2025-08-27 ENCOUNTER — FOLLOW UP (OUTPATIENT)
Dept: URBAN - METROPOLITAN AREA CLINIC 52 | Age: 85
End: 2025-08-27

## 2025-08-27 DIAGNOSIS — H35.3221: ICD-10-CM

## 2025-08-27 DIAGNOSIS — H43.12: ICD-10-CM

## 2025-08-27 DIAGNOSIS — D31.32: ICD-10-CM

## 2025-08-27 DIAGNOSIS — H43.813: ICD-10-CM

## 2025-08-27 DIAGNOSIS — Z96.1: ICD-10-CM

## 2025-08-27 DIAGNOSIS — H35.3112: ICD-10-CM

## 2025-08-27 DIAGNOSIS — H40.1133: ICD-10-CM

## 2025-08-27 PROCEDURE — 92014 COMPRE OPH EXAM EST PT 1/>: CPT | Mod: 25

## 2025-08-27 PROCEDURE — 92134 CPTRZ OPH DX IMG PST SGM RTA: CPT

## 2025-08-27 PROCEDURE — 67028 INJECTION EYE DRUG: CPT

## 2025-08-27 ASSESSMENT — TONOMETRY
OD_IOP_MMHG: 23
OS_IOP_MMHG: 12

## 2025-08-27 ASSESSMENT — VISUAL ACUITY
OD_CC: 20/800
OS_CC: 20/80